# Patient Record
Sex: FEMALE | Race: WHITE | NOT HISPANIC OR LATINO | URBAN - METROPOLITAN AREA
[De-identification: names, ages, dates, MRNs, and addresses within clinical notes are randomized per-mention and may not be internally consistent; named-entity substitution may affect disease eponyms.]

---

## 2024-08-02 ENCOUNTER — APPOINTMENT (RX ONLY)
Dept: URBAN - METROPOLITAN AREA CLINIC 144 | Facility: CLINIC | Age: 74
Setting detail: DERMATOLOGY
End: 2024-08-02

## 2024-08-02 DIAGNOSIS — Z48.89 ENCOUNTER FOR OTHER SPECIFIED SURGICAL AFTERCARE: ICD-10-CM

## 2024-08-02 PROCEDURE — ? POST-OP CHECK

## 2024-08-02 PROCEDURE — ? COUNSELING - POST-OP CHECK

## 2024-08-02 PROCEDURE — 99024 POSTOP FOLLOW-UP VISIT: CPT

## 2024-08-02 ASSESSMENT — LOCATION ZONE DERM: LOCATION ZONE: FACE

## 2024-08-02 ASSESSMENT — LOCATION DETAILED DESCRIPTION DERM: LOCATION DETAILED: LEFT INFERIOR MEDIAL MALAR CHEEK

## 2024-08-02 ASSESSMENT — LOCATION SIMPLE DESCRIPTION DERM: LOCATION SIMPLE: LEFT CHEEK

## 2024-08-02 NOTE — HPI: POST-OP CHECK
History: [facility] 7/20/24 - Debridement and complex repair to left lower eyelid, left upper eyelid and debridement of upper lip wound.

## 2024-08-27 ENCOUNTER — APPOINTMENT (RX ONLY)
Dept: URBAN - METROPOLITAN AREA CLINIC 144 | Facility: CLINIC | Age: 74
Setting detail: DERMATOLOGY
End: 2024-08-27

## 2024-08-27 DIAGNOSIS — Z48.89 ENCOUNTER FOR OTHER SPECIFIED SURGICAL AFTERCARE: ICD-10-CM

## 2024-08-27 PROCEDURE — 99212 OFFICE O/P EST SF 10 MIN: CPT

## 2024-08-27 PROCEDURE — ? POST-OP CHECK

## 2024-08-27 PROCEDURE — ? COUNSELING - POST-OP CHECK

## 2024-08-27 ASSESSMENT — LOCATION ZONE DERM: LOCATION ZONE: FACE

## 2024-08-27 ASSESSMENT — LOCATION DETAILED DESCRIPTION DERM
LOCATION DETAILED: LEFT INFERIOR FOREHEAD
LOCATION DETAILED: LEFT INFERIOR MEDIAL MALAR CHEEK

## 2024-08-27 ASSESSMENT — LOCATION SIMPLE DESCRIPTION DERM
LOCATION SIMPLE: LEFT FOREHEAD
LOCATION SIMPLE: LEFT CHEEK

## 2024-08-27 NOTE — PROCEDURE: POST-OP CHECK
Detail Level: Zone
Add Postop Global No-Charge Code (00708)?: yes
Wound Evaluated By: Dr. Marshall
Detail Level: Detailed

## 2024-08-27 NOTE — HPI: POST-OP CHECK
History: [facility] 7/20/24 - Debridement and complex repair to left lower eyelid, left upper eyelid and debridement of upper lip wound.